# Patient Record
Sex: MALE | Race: WHITE | NOT HISPANIC OR LATINO | URBAN - METROPOLITAN AREA
[De-identification: names, ages, dates, MRNs, and addresses within clinical notes are randomized per-mention and may not be internally consistent; named-entity substitution may affect disease eponyms.]

---

## 2023-04-02 ENCOUNTER — EMERGENCY (EMERGENCY)
Facility: HOSPITAL | Age: 53
LOS: 0 days | Discharge: HOME | End: 2023-04-02
Attending: EMERGENCY MEDICINE
Payer: COMMERCIAL

## 2023-04-02 VITALS
OXYGEN SATURATION: 99 % | SYSTOLIC BLOOD PRESSURE: 150 MMHG | TEMPERATURE: 97 F | DIASTOLIC BLOOD PRESSURE: 90 MMHG | RESPIRATION RATE: 18 BRPM | HEART RATE: 76 BPM
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
## 2023-04-02 DIAGNOSIS — T31.0 BURNS INVOLVING LESS THAN 10% OF BODY SURFACE: ICD-10-CM

## 2023-04-02 DIAGNOSIS — T20.14XA BURN OF FIRST DEGREE OF NOSE (SEPTUM), INITIAL ENCOUNTER: ICD-10-CM
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
## 2023-04-02 PROCEDURE — 99284 EMERGENCY DEPT VISIT MOD MDM: CPT

## 2023-04-02 PROCEDURE — 99282 EMERGENCY DEPT VISIT SF MDM: CPT
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
## 2023-04-02 NOTE — ED PROVIDER NOTE - NS ED ATTENDING STATEMENT MOD
This was a shared visit with the MY[facility]. I reviewed and verified the documentation and independently performed the documented:
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
## 2023-04-02 NOTE — ED PROVIDER NOTE - PATIENT PORTAL LINK FT
You can access the FollowMyHealth Patient Portal offered by [facility] by registering at the following website: [email or web address]. By joining [facility]’s FollowMyHealth portal, you will also be able to view your health information using other applications (apps) compatible with our system.

## 2023-04-02 NOTE — ED PROVIDER NOTE - NSFOLLOWUPINSTRUCTIONS_ED_ALL_ED_FT
Please follow up with your primary care physician within 24-72 hours and return immediately if symptoms worsen.    Burn Care, Adult  A burn is an injury to the skin or the tissues under the skin. There are three types of burns:  First degree. These burns may cause the skin to be red and a bit swollen.  Second degree. These burns are very painful and cause the skin to be very red. The skin may also leak fluid, look shiny, and start to have blisters.  Third degree. These burns cause permanent damage. They turn the skin white or black and make it look charred, dry, and leathery.  Taking care of your burn properly can help to prevent pain and infection. It can also help the burn to heal more quickly.    How is this treated?  Right after a burn:     Rinse or soak the burn under cool water. Do this for several minutes. Do not put ice on your burn. That can cause more damage.  Lightly cover the burn with a clean (sterile) cloth (dressing).  Burn care     Raise (elevate) the injured area above the level of your heart while sitting or lying down.  Follow instructions from your doctor about:  How to clean and take care of the burn.  When to change and remove the cloth.  Check your burn every day for signs of infection. Check for:  More redness, swelling, or pain.  Warmth.  Pus or a bad smell.  Medicine     Image   Take over-the-counter and prescription medicines only as told by your doctor.  If you were prescribed antibiotic medicine, take or apply it as told by your doctor. Do not stop using the antibiotic even if your condition improves.  General instructions     To prevent infection:  Do not put butter, oil, or other home treatments on the burn.  Do not scratch or pick at the burn.  Do not break any blisters.  Do not peel skin.  Do not rub your burn, even when you are cleaning it.  Protect your burn from the sun.  Contact a doctor if:  Your condition does not get better.  Your condition gets worse.  You have a fever.  Your burn looks different or starts to have black or red spots on it.  Your burn feels warm to the touch.  Your pain is not controlled with medicine.  Get help right away if:  You have redness, swelling, or pain at the site of the burn.  You have fluid, blood, or pus coming from your burn.  You have red streaks near the burn.  You have very bad pain.  This information is not intended to replace advice given to you by your health care provider. Make sure you discuss any questions you have with your health care provider.

## 2023-04-06 ENCOUNTER — OUTPATIENT (OUTPATIENT)
Dept: OUTPATIENT SERVICES | Facility: HOSPITAL | Age: 53
LOS: 1 days | End: 2023-04-06
Payer: COMMERCIAL

## 2023-04-06 ENCOUNTER — APPOINTMENT (OUTPATIENT)
Dept: BURN CARE | Facility: CLINIC | Age: 53
End: 2023-04-06
Payer: OTHER MISCELLANEOUS

## 2023-04-06 VITALS — SYSTOLIC BLOOD PRESSURE: 139 MMHG | DIASTOLIC BLOOD PRESSURE: 93 MMHG | TEMPERATURE: 97.8 F | HEART RATE: 52 BPM

## 2023-04-06 DIAGNOSIS — Z00.8 ENCOUNTER FOR OTHER GENERAL EXAMINATION: ICD-10-CM

## 2023-04-06 PROBLEM — Z00.00 ENCOUNTER FOR PREVENTIVE HEALTH EXAMINATION: Status: ACTIVE | Noted: 2023-04-06

## 2023-04-06 PROCEDURE — 99202 OFFICE O/P NEW SF 15 MIN: CPT
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
## 2023-04-12 DIAGNOSIS — T20.24XD: ICD-10-CM

## 2023-04-12 DIAGNOSIS — T20.212D: ICD-10-CM
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
## 2023-04-12 DIAGNOSIS — T31.0 BURNS INVOLVING LESS THAN 10% OF BODY SURFACE: ICD-10-CM
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
## 2023-04-13 ENCOUNTER — APPOINTMENT (OUTPATIENT)
Dept: BURN CARE | Facility: CLINIC | Age: 53
End: 2023-04-13
Payer: OTHER MISCELLANEOUS

## 2023-04-13 ENCOUNTER — OUTPATIENT (OUTPATIENT)
Dept: OUTPATIENT SERVICES | Facility: HOSPITAL | Age: 53
LOS: 1 days | End: 2023-04-13
Payer: COMMERCIAL

## 2023-04-13 VITALS — HEART RATE: 54 BPM | DIASTOLIC BLOOD PRESSURE: 96 MMHG | TEMPERATURE: 98.2 F | SYSTOLIC BLOOD PRESSURE: 133 MMHG

## 2023-04-13 DIAGNOSIS — Z09 ENCOUNTER FOR FOLLOW-UP EXAMINATION AFTER COMPLETED TREATMENT FOR CONDITIONS OTHER THAN MALIGNANT NEOPLASM: ICD-10-CM

## 2023-04-13 DIAGNOSIS — Z87.828 PERSONAL HISTORY OF OTHER (HEALED) PHYSICAL INJURY AND TRAUMA: ICD-10-CM

## 2023-04-13 DIAGNOSIS — Z00.8 ENCOUNTER FOR OTHER GENERAL EXAMINATION: ICD-10-CM

## 2023-04-13 PROCEDURE — 99212 OFFICE O/P EST SF 10 MIN: CPT
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
